# Patient Record
Sex: FEMALE | Race: WHITE | Employment: UNEMPLOYED | URBAN - METROPOLITAN AREA
[De-identification: names, ages, dates, MRNs, and addresses within clinical notes are randomized per-mention and may not be internally consistent; named-entity substitution may affect disease eponyms.]

---

## 2017-04-21 PROBLEM — R55 SYNCOPE AND COLLAPSE: Status: ACTIVE | Noted: 2017-04-21
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
## 2018-06-25 ENCOUNTER — HOSPITAL ENCOUNTER (OUTPATIENT)
Dept: OTHER | Age: 63
Discharge: HOME | End: 2018-06-25
Attending: PSYCHIATRY & NEUROLOGY | Admitting: PSYCHIATRY & NEUROLOGY

## 2018-06-25 LAB
CREAT SERPL-MCNC: 0.6 MG/DL (ref 0.6–1.2)
GFR AFRICAN AMERICAN: >60
GFR NON-AFRICAN AMERICAN: >60

## 2019-10-21 ENCOUNTER — HOSPITAL ENCOUNTER (OUTPATIENT)
Age: 64
Discharge: HOME | End: 2019-10-21
Payer: COMMERCIAL

## 2019-10-21 ENCOUNTER — INITIAL CONSULT (OUTPATIENT)
Dept: NEUROLOGY | Age: 64
End: 2019-10-21
Payer: COMMERCIAL

## 2019-10-21 VITALS
BODY MASS INDEX: 27.46 KG/M2 | HEIGHT: 63 IN | OXYGEN SATURATION: 96 % | HEART RATE: 60 BPM | SYSTOLIC BLOOD PRESSURE: 137 MMHG | WEIGHT: 155 LBS | DIASTOLIC BLOOD PRESSURE: 76 MMHG

## 2019-10-21 DIAGNOSIS — E78.5 DYSLIPIDEMIA: ICD-10-CM

## 2019-10-21 DIAGNOSIS — R41.3 MEMORY LOSS: ICD-10-CM

## 2019-10-21 DIAGNOSIS — G40.909 SEIZURE DISORDER (HCC): ICD-10-CM

## 2019-10-21 DIAGNOSIS — G40.909 SEIZURE DISORDER (HCC): Primary | ICD-10-CM

## 2019-10-21 DIAGNOSIS — G31.89 COGNITIVE AND NEUROBEHAVIORAL DYSFUNCTION FOLLOWING BRAIN INJURY (HCC): ICD-10-CM

## 2019-10-21 DIAGNOSIS — G47.26 SLEEP DISORDER, SHIFT WORK: ICD-10-CM

## 2019-10-21 DIAGNOSIS — F09 COGNITIVE AND NEUROBEHAVIORAL DYSFUNCTION FOLLOWING BRAIN INJURY (HCC): ICD-10-CM

## 2019-10-21 DIAGNOSIS — S06.9XAS COGNITIVE AND NEUROBEHAVIORAL DYSFUNCTION FOLLOWING BRAIN INJURY (HCC): ICD-10-CM

## 2019-10-21 DIAGNOSIS — I10 HTN (HYPERTENSION), BENIGN: ICD-10-CM

## 2019-10-21 LAB
FOLATE: >20 NG/ML (ref 4.78–24.2)
TSH SERPL DL<=0.05 MIU/L-ACNC: 1.86 UIU/ML (ref 0.27–4.2)
VITAMIN B-12: 1260 PG/ML (ref 211–911)

## 2019-10-21 PROCEDURE — 36415 COLL VENOUS BLD VENIPUNCTURE: CPT

## 2019-10-21 PROCEDURE — 84443 ASSAY THYROID STIM HORMONE: CPT

## 2019-10-21 PROCEDURE — G8419 CALC BMI OUT NRM PARAM NOF/U: HCPCS | Performed by: PSYCHIATRY & NEUROLOGY

## 2019-10-21 PROCEDURE — 1036F TOBACCO NON-USER: CPT | Performed by: PSYCHIATRY & NEUROLOGY

## 2019-10-21 PROCEDURE — 99204 OFFICE O/P NEW MOD 45 MIN: CPT | Performed by: PSYCHIATRY & NEUROLOGY

## 2019-10-21 PROCEDURE — 82607 VITAMIN B-12: CPT

## 2019-10-21 PROCEDURE — 3017F COLORECTAL CA SCREEN DOC REV: CPT | Performed by: PSYCHIATRY & NEUROLOGY

## 2019-10-21 PROCEDURE — G8599 NO ASA/ANTIPLAT THER USE RNG: HCPCS | Performed by: PSYCHIATRY & NEUROLOGY

## 2019-10-21 PROCEDURE — G8427 DOCREV CUR MEDS BY ELIG CLIN: HCPCS | Performed by: PSYCHIATRY & NEUROLOGY

## 2019-10-21 PROCEDURE — G8484 FLU IMMUNIZE NO ADMIN: HCPCS | Performed by: PSYCHIATRY & NEUROLOGY

## 2019-10-21 PROCEDURE — 82746 ASSAY OF FOLIC ACID SERUM: CPT

## 2019-10-21 RX ORDER — HYDROCHLOROTHIAZIDE 25 MG/1
TABLET ORAL
COMMUNITY
Start: 2019-10-18

## 2019-10-21 RX ORDER — LEVETIRACETAM 500 MG/1
500 TABLET ORAL 2 TIMES DAILY
Qty: 180 TABLET | Refills: 1 | Status: SHIPPED | OUTPATIENT
Start: 2019-10-21 | End: 2020-04-16

## 2019-10-21 RX ORDER — POTASSIUM CHLORIDE 750 MG/1
TABLET, FILM COATED, EXTENDED RELEASE ORAL
COMMUNITY
Start: 2019-09-18

## 2019-12-04 ENCOUNTER — OFFICE VISIT (OUTPATIENT)
Dept: NEUROLOGY | Age: 64
End: 2019-12-04
Payer: COMMERCIAL

## 2019-12-04 VITALS
SYSTOLIC BLOOD PRESSURE: 110 MMHG | BODY MASS INDEX: 27.46 KG/M2 | HEART RATE: 60 BPM | HEIGHT: 63 IN | OXYGEN SATURATION: 94 % | WEIGHT: 155 LBS | DIASTOLIC BLOOD PRESSURE: 71 MMHG

## 2019-12-04 DIAGNOSIS — G47.26 SLEEP DISORDER, SHIFT WORK: ICD-10-CM

## 2019-12-04 DIAGNOSIS — E78.5 DYSLIPIDEMIA: ICD-10-CM

## 2019-12-04 DIAGNOSIS — G40.909 SEIZURE DISORDER (HCC): ICD-10-CM

## 2019-12-04 DIAGNOSIS — G31.84 MCI (MILD COGNITIVE IMPAIRMENT): ICD-10-CM

## 2019-12-04 DIAGNOSIS — G31.89 COGNITIVE AND NEUROBEHAVIORAL DYSFUNCTION FOLLOWING BRAIN INJURY (HCC): Primary | ICD-10-CM

## 2019-12-04 DIAGNOSIS — S06.9XAS COGNITIVE AND NEUROBEHAVIORAL DYSFUNCTION FOLLOWING BRAIN INJURY (HCC): Primary | ICD-10-CM

## 2019-12-04 DIAGNOSIS — I10 HTN (HYPERTENSION), BENIGN: ICD-10-CM

## 2019-12-04 DIAGNOSIS — F09 COGNITIVE AND NEUROBEHAVIORAL DYSFUNCTION FOLLOWING BRAIN INJURY (HCC): Primary | ICD-10-CM

## 2019-12-04 PROCEDURE — 3017F COLORECTAL CA SCREEN DOC REV: CPT | Performed by: PSYCHIATRY & NEUROLOGY

## 2019-12-04 PROCEDURE — G8427 DOCREV CUR MEDS BY ELIG CLIN: HCPCS | Performed by: PSYCHIATRY & NEUROLOGY

## 2019-12-04 PROCEDURE — 1036F TOBACCO NON-USER: CPT | Performed by: PSYCHIATRY & NEUROLOGY

## 2019-12-04 PROCEDURE — G8484 FLU IMMUNIZE NO ADMIN: HCPCS | Performed by: PSYCHIATRY & NEUROLOGY

## 2019-12-04 PROCEDURE — G8599 NO ASA/ANTIPLAT THER USE RNG: HCPCS | Performed by: PSYCHIATRY & NEUROLOGY

## 2019-12-04 PROCEDURE — 99214 OFFICE O/P EST MOD 30 MIN: CPT | Performed by: PSYCHIATRY & NEUROLOGY

## 2019-12-04 PROCEDURE — G8419 CALC BMI OUT NRM PARAM NOF/U: HCPCS | Performed by: PSYCHIATRY & NEUROLOGY

## 2020-04-13 ENCOUNTER — TELEPHONE (OUTPATIENT)
Dept: NEUROLOGY | Age: 65
End: 2020-04-13

## 2020-04-16 ENCOUNTER — VIRTUAL VISIT (OUTPATIENT)
Dept: NEUROLOGY | Age: 65
End: 2020-04-16
Payer: MEDICARE

## 2020-04-16 PROBLEM — F02.80 DEMENTIA DUE TO ALZHEIMER'S DISEASE (HCC): Status: ACTIVE | Noted: 2020-04-16

## 2020-04-16 PROBLEM — G30.9 DEMENTIA DUE TO ALZHEIMER'S DISEASE (HCC): Status: ACTIVE | Noted: 2020-04-16

## 2020-04-16 PROCEDURE — 99214 OFFICE O/P EST MOD 30 MIN: CPT | Performed by: PSYCHIATRY & NEUROLOGY

## 2020-04-16 RX ORDER — DONEPEZIL HYDROCHLORIDE 5 MG/1
5 TABLET, FILM COATED ORAL DAILY
Qty: 30 TABLET | Refills: 2 | Status: SHIPPED | OUTPATIENT
Start: 2020-04-16 | End: 2020-06-04 | Stop reason: SDUPTHER

## 2020-04-16 RX ORDER — LEVETIRACETAM 500 MG/1
TABLET ORAL
Qty: 180 TABLET | Refills: 1 | Status: SHIPPED | OUTPATIENT
Start: 2020-04-16 | End: 2020-10-19

## 2020-04-16 NOTE — TELEPHONE ENCOUNTER
Last seen 12.04.19     Last office note states to RTO 2-3 months/02-03.2020     Next appointment scheduled for today 04.16.20.
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
## 2020-05-12 ENCOUNTER — HOSPITAL ENCOUNTER (OUTPATIENT)
Dept: MRI IMAGING | Age: 65
Discharge: HOME | End: 2020-05-12
Payer: MEDICARE

## 2020-05-12 PROCEDURE — 70551 MRI BRAIN STEM W/O DYE: CPT

## 2020-06-04 ENCOUNTER — OFFICE VISIT (OUTPATIENT)
Dept: NEUROLOGY | Age: 65
End: 2020-06-04
Payer: MEDICARE

## 2020-06-04 VITALS
HEIGHT: 63 IN | TEMPERATURE: 97.4 F | HEART RATE: 57 BPM | DIASTOLIC BLOOD PRESSURE: 60 MMHG | WEIGHT: 154 LBS | OXYGEN SATURATION: 95 % | BODY MASS INDEX: 27.29 KG/M2 | SYSTOLIC BLOOD PRESSURE: 99 MMHG

## 2020-06-04 PROCEDURE — 99214 OFFICE O/P EST MOD 30 MIN: CPT | Performed by: PSYCHIATRY & NEUROLOGY

## 2020-06-04 PROCEDURE — 1036F TOBACCO NON-USER: CPT | Performed by: PSYCHIATRY & NEUROLOGY

## 2020-06-04 PROCEDURE — G8427 DOCREV CUR MEDS BY ELIG CLIN: HCPCS | Performed by: PSYCHIATRY & NEUROLOGY

## 2020-06-04 PROCEDURE — 3017F COLORECTAL CA SCREEN DOC REV: CPT | Performed by: PSYCHIATRY & NEUROLOGY

## 2020-06-04 PROCEDURE — G8419 CALC BMI OUT NRM PARAM NOF/U: HCPCS | Performed by: PSYCHIATRY & NEUROLOGY

## 2020-06-04 RX ORDER — DONEPEZIL HYDROCHLORIDE 10 MG/1
10 TABLET, FILM COATED ORAL DAILY
Qty: 90 TABLET | Refills: 1 | Status: SHIPPED | OUTPATIENT
Start: 2020-06-04 | End: 2020-11-18

## 2020-06-04 NOTE — PROGRESS NOTES
addressed in details  Continue Keppra 500 mg twice daily  No driving  Seizure precaution, side effect from Keppra and risk of falling were addressed    Discussed behavioral cognitive therapy to improve memory  Discussed MRI results with the patient and family    Addressed possibility of insomnia and shiftwork disorder. Long and detailed discussion today regarding improving sleep hygiene and treatment of shiftwork disorder. She was advised take melatonin at that time and to avoid daytime naps. She was also advised to get good exposure to sunlight during daytime and avoid exposure in the afternoon.     Continue current blood pressure medication  Aspirin  Continue monitor blood sugar and follow A1c  Continue SSRI    Follow-up 6-month

## 2020-10-19 RX ORDER — LEVETIRACETAM 500 MG/1
TABLET ORAL
Qty: 180 TABLET | Refills: 0 | Status: SHIPPED | OUTPATIENT
Start: 2020-10-19 | End: 2020-12-08 | Stop reason: SDUPTHER

## 2020-10-20 RX ORDER — LEVETIRACETAM 500 MG/1
TABLET ORAL
Qty: 180 TABLET | Refills: 0 | OUTPATIENT
Start: 2020-10-20

## 2020-10-21 RX ORDER — LEVETIRACETAM 500 MG/1
TABLET ORAL
Qty: 180 TABLET | Refills: 0 | OUTPATIENT
Start: 2020-10-21

## 2020-11-18 RX ORDER — DONEPEZIL HYDROCHLORIDE 10 MG/1
TABLET, FILM COATED ORAL
Qty: 90 TABLET | Refills: 0 | Status: SHIPPED | OUTPATIENT
Start: 2020-11-18 | End: 2020-12-08 | Stop reason: SDUPTHER

## 2020-11-18 NOTE — TELEPHONE ENCOUNTER
Last seen 06.04.20    Last office note states to RTO 6 months/12.2020    Next appointment scheduled for 12.08.20

## 2020-12-08 ENCOUNTER — OFFICE VISIT (OUTPATIENT)
Dept: NEUROLOGY | Age: 65
End: 2020-12-08
Payer: MEDICARE

## 2020-12-08 VITALS
WEIGHT: 154 LBS | OXYGEN SATURATION: 99 % | SYSTOLIC BLOOD PRESSURE: 123 MMHG | HEART RATE: 65 BPM | BODY MASS INDEX: 27.29 KG/M2 | TEMPERATURE: 95.9 F | DIASTOLIC BLOOD PRESSURE: 73 MMHG | HEIGHT: 63 IN

## 2020-12-08 PROBLEM — G25.0 ESSENTIAL TREMOR: Status: ACTIVE | Noted: 2020-12-08

## 2020-12-08 PROCEDURE — G8484 FLU IMMUNIZE NO ADMIN: HCPCS | Performed by: PSYCHIATRY & NEUROLOGY

## 2020-12-08 PROCEDURE — G8427 DOCREV CUR MEDS BY ELIG CLIN: HCPCS | Performed by: PSYCHIATRY & NEUROLOGY

## 2020-12-08 PROCEDURE — 3017F COLORECTAL CA SCREEN DOC REV: CPT | Performed by: PSYCHIATRY & NEUROLOGY

## 2020-12-08 PROCEDURE — G8419 CALC BMI OUT NRM PARAM NOF/U: HCPCS | Performed by: PSYCHIATRY & NEUROLOGY

## 2020-12-08 PROCEDURE — 3046F HEMOGLOBIN A1C LEVEL >9.0%: CPT | Performed by: PSYCHIATRY & NEUROLOGY

## 2020-12-08 PROCEDURE — 2022F DILAT RTA XM EVC RTNOPTHY: CPT | Performed by: PSYCHIATRY & NEUROLOGY

## 2020-12-08 PROCEDURE — 99214 OFFICE O/P EST MOD 30 MIN: CPT | Performed by: PSYCHIATRY & NEUROLOGY

## 2020-12-08 PROCEDURE — 1036F TOBACCO NON-USER: CPT | Performed by: PSYCHIATRY & NEUROLOGY

## 2020-12-08 RX ORDER — LEVETIRACETAM 500 MG/1
TABLET ORAL
Qty: 180 TABLET | Refills: 1 | Status: SHIPPED | OUTPATIENT
Start: 2020-12-08 | End: 2021-08-11 | Stop reason: SDUPTHER

## 2020-12-08 RX ORDER — DONEPEZIL HYDROCHLORIDE 10 MG/1
TABLET, FILM COATED ORAL
Qty: 90 TABLET | Refills: 1 | Status: SHIPPED | OUTPATIENT
Start: 2020-12-08 | End: 2021-08-11 | Stop reason: SDUPTHER

## 2020-12-08 NOTE — PROGRESS NOTES
The patient came today for follow up regarding: Memory loss and history of seizures. Since her last visit, she continues to have daily episode of short-term memory loss. Degree is moderate. She can be forgetful to daily events no recent memory. So far she is able to function by herself. No recent worsening of her memory. No falling or injury. No psychosis, hallucination or suicidal ideation or thoughts. No other triggers or other associated symptoms. She is on Aricept 10 mg daily. No side effect of Aricept. Reported mild improvement on such medicine. She does have history of seizure disorder. Last seizure was in 2018. She is on Keppra 500 mg twice daily. She denies any side effect from [address]. The same chronic insomnia. No severe EDS. No other parasomnia or sleep disturbance. She takes melatonin as needed. She does have history of diabetes and hypertension. No recent A1c. She does not measure her monitor blood pressure blood sugar daily. She takes aspirin and statin. No recent change with her medications. She is concerned about chronic tremors in both hands and shivering of her lips. Degree is mild and frequency is daily and persistent. Triggered by stress or action tremors. No resting tremors. So for these tremors are not interfering with ADL. She denies any rigidity or stiffness. No other new symptoms today. Other review of system was unremarkable. Family History   Problem Relation Age of Onset   [place] Alzheimer's Disease Mother     Diabetes Father     Hypertension Father        Past Medical History:   Diagnosis Date    Anxiety     CAD (coronary artery disease)     [facility]     Diabetes mellitus [facility]     Hyperlipidemia     Hypertension      Prior to Visit Medications    Medication Sig Taking?  Authorizing Provider   levETIRAcetam (KEPPRA) 500 MG tablet TAKE ONE TABLET BY MOUTH TWICE Kandice Matt MD   donepezil (ARICEPT) 10 MG tablet TAKE ONE TABLET BY MOUTH DAILY Yes Shay Astudillo MD   potassium chloride (KLOR-CON) 10 MEQ extended release tablet TAKE TWO TABLETS BY MOUTH TWICE A DAY Yes Historical Provider, MD   hydrochlorothiazide (HYDRODIURIL) 25 MG tablet  Yes Historical Provider, MD   clonazePAM (KLONOPIN) 0.5 MG tablet Take 0.5 mg by mouth nightly as needed for Anxiety. Yes Historical Provider, MD   aspirin 81 MG EC tablet Take 1 tablet by mouth daily Yes Jordin Fischer MD   carvedilol (COREG) 12.5 MG tablet Take 12.5 mg by mouth 2 times daily (with meals) Yes Historical Provider, MD   PARoxetine (PAXIL) 40 MG tablet Take 60 mg by mouth every morning Yes Historical Provider, MD   simvastatin (ZOCOR) 40 MG tablet Take 1 tablet by mouth nightly. Yes Devonte Ordonez MD     No Known Allergies  Social History     Tobacco Use    Smoking status: Never Smoker    Smokeless tobacco: Never Used   Substance Use Topics    Alcohol use: No     Past Surgical History:   Procedure Laterality Date    CORONARY ANGIOPLASTY WITH STENT PLACEMENT      7 times    CORONARY ARTERY BYPASS GRAFT      Triple bypass    HYSTERECTOMY             Exam:   Constitutional:   Vitals:    12/08/20 0935   BP: 123/73   Pulse: 65   Temp: 95.9 °F (35.5 °C)   TempSrc: Infrared   SpO2: 99%   Weight: 154 lb (69.9 kg)   Height: 5' 3\" (1.6 m)       General appearance:  Normal development and appear in no acute distress. Eye: No icterus. Neck: supple  Cardiovascular:   No lower leg edema with good pulsation. Mental Status: no change  Oriented to person, place, problem, and time. Memory: Good immediate recall. Intact remote memory  Normal attention span and concentration. Language: intact naming, repeating and fluency   Poor fund of Knowledge. Aware of current events and vocabulary   Cranial Nerves:   II:   Pupils: equal, round, reactive to light  III,IV,VI: Extra Ocular Movements are intact.  No nystagmus  V: Facial sensation is intact   VII: Facial strength and movements: intact and symmetric  XII: Tongue movements are normal  Musculoskeletal: 5/5 in all 4 extremities. Tone: Normal tone. Reflexes: Bilateral biceps 2/4, triceps 2/4, brachial radialis 2/4, knee 2/4 and ankle 2/4. Coordination: no pronator drift, no dysmetria with FNF. Normal REM. Mild postural hand tremors. No resting tremors or cogwheeling. Sensation: normal to all modalities in both arms and legs. Gait/Posture: steady gait   unchanged      ROS : A 10-14 system review of constitutional, cardiovascular, respiratory, GI, eyes, , ENT, musculoskeletal, endocrine, hematological, skin, SHEENT, genitourinary, psychiatric and neurologic systems was obtained and updated today which is unremarkable except as mentioned in my HPI  The same    Medical decision making: I personally reviewed and updated social history, past medical history, medications, allergy, surgical history, and family history as documented in the patient's electronic health records. Labs and/or neuroimaging and other test results reviewed and discussed with the patient. Reviewed notes from other physicians. Provided patient education regarding risk, benefits and treatment options as well as adherence to medication regimen and side effect from these medications. Assessment:      1. Cognitive and neurobehavioral dysfunction [facility] ([facility]  Likely combination of chronic cognitive impairment secondary to history of brain injury and underlying neurodegenerative disorder    Continue Aricept 10 mg daily  6-month refill  Side effect was discussed  Discussed cognitive and behavior therapy with improve memory at home  Driving precautions. - donepezil (ARICEPT) 10 MG tablet; TAKE ONE TABLET BY MOUTH DAILY  Dispense: 90 tablet; Refill: 1    2.  Seizure disorder ([facility] [address] controlled  Continue Keppra 500 mg twice daily  6-month refill  Discussed side effect of Keppra, seizure precaution, risk of falling and injury and driving restrictions. - levETIRAcetam (KEPPRA) 500 MG tablet; TAKE ONE TABLET BY MOUTH TWICE A DAY  Dispense: 180 tablet; Refill: 1    3. HTN (hypertension), benign  Advised the patient to monitor blood pressure weekly  Continue current medications    4. DM (diabetes mellitus), type 2, controlled with complications ([facility]  Follow A1c with PCP  Blood sugar monitor at home  Aspirin  Statin for stroke prevention    5. Sleep disorder, shift work that resulted in insomnia  Discussed improving sleep hygiene avoid daytime naps  She should be strict with nighttime schedule    6. Dyslipidemia  Statin and follow LDL    7. Essential tremor, benign. No signs of Parkinson disease. No need for further intervention at this time as it is not interfering with ADL.   I assured the patient regarding benign nature of these tremors    Follow-up 6-month

## 2021-06-30 DIAGNOSIS — F09 COGNITIVE AND NEUROBEHAVIORAL DYSFUNCTION FOLLOWING BRAIN INJURY (HCC): ICD-10-CM

## 2021-06-30 DIAGNOSIS — G31.89 COGNITIVE AND NEUROBEHAVIORAL DYSFUNCTION FOLLOWING BRAIN INJURY (HCC): ICD-10-CM

## 2021-06-30 DIAGNOSIS — G40.909 SEIZURE DISORDER (HCC): ICD-10-CM

## 2021-06-30 DIAGNOSIS — S06.9XAS COGNITIVE AND NEUROBEHAVIORAL DYSFUNCTION FOLLOWING BRAIN INJURY (HCC): ICD-10-CM

## 2021-06-30 RX ORDER — LEVETIRACETAM 500 MG/1
TABLET ORAL
Qty: 180 TABLET | Refills: 0 | OUTPATIENT
Start: 2021-06-30

## 2021-06-30 RX ORDER — DONEPEZIL HYDROCHLORIDE 10 MG/1
TABLET, FILM COATED ORAL
Qty: 90 TABLET | Refills: 0 | OUTPATIENT
Start: 2021-06-30

## 2021-08-09 DIAGNOSIS — F09 COGNITIVE AND NEUROBEHAVIORAL DYSFUNCTION FOLLOWING BRAIN INJURY (HCC): ICD-10-CM

## 2021-08-09 DIAGNOSIS — S06.9XAS COGNITIVE AND NEUROBEHAVIORAL DYSFUNCTION FOLLOWING BRAIN INJURY (HCC): ICD-10-CM

## 2021-08-09 DIAGNOSIS — G40.909 SEIZURE DISORDER (HCC): ICD-10-CM

## 2021-08-09 DIAGNOSIS — G31.89 COGNITIVE AND NEUROBEHAVIORAL DYSFUNCTION FOLLOWING BRAIN INJURY (HCC): ICD-10-CM

## 2021-08-10 RX ORDER — DONEPEZIL HYDROCHLORIDE 10 MG/1
TABLET, FILM COATED ORAL
Qty: 90 TABLET | Refills: 0 | OUTPATIENT
Start: 2021-08-10

## 2021-08-10 RX ORDER — LEVETIRACETAM 500 MG/1
TABLET ORAL
Qty: 180 TABLET | Refills: 0 | OUTPATIENT
Start: 2021-08-10

## 2021-08-10 NOTE — TELEPHONE ENCOUNTER
Patient needs to schedule a follow up appointment.     Last seen 12.08.20     Last office note states to RTO 6 months/06.2021     Next appointment scheduled for none.  (pt noshowed fu appt on 06.01.21)

## 2021-08-11 DIAGNOSIS — G40.909 SEIZURE DISORDER (HCC): ICD-10-CM

## 2021-08-11 DIAGNOSIS — S06.9XAS COGNITIVE AND NEUROBEHAVIORAL DYSFUNCTION FOLLOWING BRAIN INJURY (HCC): ICD-10-CM

## 2021-08-11 DIAGNOSIS — G31.89 COGNITIVE AND NEUROBEHAVIORAL DYSFUNCTION FOLLOWING BRAIN INJURY (HCC): ICD-10-CM

## 2021-08-11 DIAGNOSIS — F09 COGNITIVE AND NEUROBEHAVIORAL DYSFUNCTION FOLLOWING BRAIN INJURY (HCC): ICD-10-CM

## 2021-08-11 RX ORDER — LEVETIRACETAM 500 MG/1
TABLET ORAL
Qty: 180 TABLET | Refills: 1 | OUTPATIENT
Start: 2021-08-11

## 2021-08-11 NOTE — TELEPHONE ENCOUNTER
Pt's niece Salena Chen called to schedule appt that pt missed in June & to see if she could get refills on her meds. Scheduled her an appt & she is requesting refills on pt's Aricept & Keppra (Use [facility] pharmacy on file). Last appt was 12/8/20. Next appt is 11/9/21. Please advise. Thank you.

## 2021-08-11 NOTE — TELEPHONE ENCOUNTER
Last seen 12.08.20    Last office note states to RTO 6 months/06.2021    Next appointment scheduled for 11.09.21

## 2021-08-12 RX ORDER — LEVETIRACETAM 500 MG/1
TABLET ORAL
Qty: 180 TABLET | Refills: 0 | Status: SHIPPED | OUTPATIENT
Start: 2021-08-12 | End: 2021-11-10

## 2021-08-12 RX ORDER — DONEPEZIL HYDROCHLORIDE 10 MG/1
TABLET, FILM COATED ORAL
Qty: 90 TABLET | Refills: 0 | Status: SHIPPED | OUTPATIENT
Start: 2021-08-12 | End: 2021-11-10

## 2021-11-10 DIAGNOSIS — S06.9XAS COGNITIVE AND NEUROBEHAVIORAL DYSFUNCTION FOLLOWING BRAIN INJURY (HCC): ICD-10-CM

## 2021-11-10 DIAGNOSIS — G40.909 SEIZURE DISORDER (HCC): ICD-10-CM

## 2021-11-10 DIAGNOSIS — F09 COGNITIVE AND NEUROBEHAVIORAL DYSFUNCTION FOLLOWING BRAIN INJURY (HCC): ICD-10-CM

## 2021-11-10 DIAGNOSIS — G31.89 COGNITIVE AND NEUROBEHAVIORAL DYSFUNCTION FOLLOWING BRAIN INJURY (HCC): ICD-10-CM

## 2021-11-10 RX ORDER — LEVETIRACETAM 500 MG/1
TABLET ORAL
Qty: 180 TABLET | Refills: 0 | Status: SHIPPED | OUTPATIENT
Start: 2021-11-10 | End: 2022-02-02

## 2021-11-10 RX ORDER — DONEPEZIL HYDROCHLORIDE 10 MG/1
TABLET, FILM COATED ORAL
Qty: 90 TABLET | Refills: 0 | Status: SHIPPED | OUTPATIENT
Start: 2021-11-10 | End: 2022-02-09

## 2021-11-10 NOTE — TELEPHONE ENCOUNTER
Last seen: 12/8/20    Assessment:        1. Cognitive and neurobehavioral dysfunction following brain injury ([facility]  Likely combination of chronic cognitive impairment secondary to history of brain injury and underlying neurodegenerative disorder     Continue Aricept 10 mg daily  6-month refill  Side effect was discussed  Discussed cognitive and behavior therapy with improve memory at home  Driving precautions. - donepezil (ARICEPT) 10 MG tablet; TAKE ONE TABLET BY MOUTH DAILY  Dispense: 90 tablet; Refill: 1     2. Seizure disorder ([facility] [address] [facility] controlled  Continue Keppra 500 mg twice daily  6-month refill  Discussed side effect of Keppra, seizure precaution, risk of falling and injury and driving restrictions.           - levETIRAcetam (KEPPRA) 500 MG tablet; TAKE ONE TABLET BY MOUTH TWICE A DAY  Dispense: 180 tablet; Refill: 1     3. HTN (hypertension), benign  Advised the patient to monitor blood pressure weekly  Continue current medications     4. DM (diabetes mellitus), type 2, controlled with complications (HCC)  Follow A1c with PCP  Blood sugar monitor at home  Aspirin  Statin for stroke prevention     5. Sleep disorder, shift work that resulted in insomnia  Discussed improving sleep hygiene avoid daytime naps  She should be strict with nighttime schedule     6. Dyslipidemia  Statin and follow LDL     7. Essential tremor, benign. No signs of Parkinson disease. No need for further intervention at this time as it is not interfering with ADL. I assured the patient regarding benign nature of these tremors     Follow-up 6-month    Next apt: 12/2/21    Last filled:  Both meds last filled 8/12/21 for 90 day supply with zero refills)

## 2021-11-16 DIAGNOSIS — F09 COGNITIVE AND NEUROBEHAVIORAL DYSFUNCTION FOLLOWING BRAIN INJURY (HCC): ICD-10-CM

## 2021-11-16 DIAGNOSIS — G31.89 COGNITIVE AND NEUROBEHAVIORAL DYSFUNCTION FOLLOWING BRAIN INJURY (HCC): ICD-10-CM

## 2021-11-16 DIAGNOSIS — S06.9XAS COGNITIVE AND NEUROBEHAVIORAL DYSFUNCTION FOLLOWING BRAIN INJURY (HCC): ICD-10-CM

## 2021-11-17 RX ORDER — DONEPEZIL HYDROCHLORIDE 10 MG/1
TABLET, FILM COATED ORAL
Qty: 90 TABLET | Refills: 0 | OUTPATIENT
Start: 2021-11-17

## 2021-11-17 NOTE — TELEPHONE ENCOUNTER
Donepezil 10 mg tabs were last filled on 11/10/21 (#90 w/ zero refills). Rx denied because this request came through too soon.

## 2022-01-31 DIAGNOSIS — G40.909 SEIZURE DISORDER (HCC): ICD-10-CM

## 2022-02-01 NOTE — TELEPHONE ENCOUNTER
LOV 12-8-2020  NOV 6-1-2021 No show, 11-9-2021 Cancelled, 12-2-2021 No show    Called and spoke to caretaker scheduled appt for 2-

## 2022-02-02 RX ORDER — LEVETIRACETAM 500 MG/1
TABLET ORAL
Qty: 180 TABLET | Refills: 0 | Status: SHIPPED | OUTPATIENT
Start: 2022-02-02 | End: 2022-02-09

## 2022-02-09 DIAGNOSIS — F09 COGNITIVE AND NEUROBEHAVIORAL DYSFUNCTION FOLLOWING BRAIN INJURY (HCC): ICD-10-CM

## 2022-02-09 DIAGNOSIS — S06.9XAS COGNITIVE AND NEUROBEHAVIORAL DYSFUNCTION FOLLOWING BRAIN INJURY (HCC): ICD-10-CM

## 2022-02-09 DIAGNOSIS — G31.89 COGNITIVE AND NEUROBEHAVIORAL DYSFUNCTION FOLLOWING BRAIN INJURY (HCC): ICD-10-CM

## 2022-02-09 DIAGNOSIS — G40.909 SEIZURE DISORDER (HCC): ICD-10-CM

## 2022-02-09 RX ORDER — LEVETIRACETAM 500 MG/1
TABLET ORAL
Qty: 180 TABLET | Refills: 0 | Status: SHIPPED | OUTPATIENT
Start: 2022-02-09 | End: 2022-08-15 | Stop reason: SDUPTHER

## 2022-02-09 RX ORDER — DONEPEZIL HYDROCHLORIDE 10 MG/1
TABLET, FILM COATED ORAL
Qty: 90 TABLET | Refills: 0 | Status: SHIPPED | OUTPATIENT
Start: 2022-02-09 | End: 2022-05-11 | Stop reason: SDUPTHER

## 2022-02-15 ENCOUNTER — TELEPHONE (OUTPATIENT)
Dept: NEUROLOGY | Age: 67
End: 2022-02-15

## 2022-05-10 DIAGNOSIS — S06.9XAS COGNITIVE AND NEUROBEHAVIORAL DYSFUNCTION FOLLOWING BRAIN INJURY (HCC): ICD-10-CM

## 2022-05-10 DIAGNOSIS — G31.89 COGNITIVE AND NEUROBEHAVIORAL DYSFUNCTION FOLLOWING BRAIN INJURY (HCC): ICD-10-CM

## 2022-05-10 DIAGNOSIS — F09 COGNITIVE AND NEUROBEHAVIORAL DYSFUNCTION FOLLOWING BRAIN INJURY (HCC): ICD-10-CM

## 2022-05-11 RX ORDER — DONEPEZIL HYDROCHLORIDE 10 MG/1
TABLET, FILM COATED ORAL
Qty: 90 TABLET | Refills: 0 | Status: SHIPPED | OUTPATIENT
Start: 2022-05-11 | End: 2022-08-15 | Stop reason: SDUPTHER

## 2022-05-11 NOTE — TELEPHONE ENCOUNTER
LOV 12-  NOV scheduled    Assessment:        1. Cognitive and neurobehavioral dysfunction [facility]  Likely combination of chronic cognitive impairment secondary to history of brain injury and underlying neurodegenerative disorder     Continue Aricept 10 mg daily  6-month refill  Side effect was discussed  Discussed cognitive and behavior therapy with improve memory at home  Driving precautions. - donepezil (ARICEPT) 10 MG tablet; TAKE ONE TABLET BY MOUTH DAILY  Dispense: 90 tablet; Refill: 1     2. Seizure disorder ([facility] controlled  Continue Keppra 500 mg twice daily  6-month refill  Discussed side effect of Keppra, seizure precaution, risk of falling and injury and driving restrictions.           - levETIRAcetam (KEPPRA) 500 MG tablet; TAKE ONE TABLET BY MOUTH TWICE A DAY  Dispense: 180 tablet; Refill: 1     3. HTN (hypertension), benign  Advised the patient to monitor blood pressure weekly  Continue current medications     4. DM (diabetes mellitus), type 2, controlled with complications (HCC)  Follow A1c with PCP  Blood sugar monitor at home  Aspirin  Statin for stroke prevention     5. Sleep disorder, shift work that resulted in insomnia  Discussed improving sleep hygiene avoid daytime naps  She should be strict with nighttime schedule     6. Dyslipidemia  Statin and follow LDL     7. Essential tremor, benign. No signs of Parkinson disease. No need for further intervention at this time as it is not interfering with ADL.   I assured the patient regarding benign nature of these tremors     Follow-up 6-month

## 2022-08-15 DIAGNOSIS — G40.909 SEIZURE DISORDER (HCC): ICD-10-CM

## 2022-08-15 DIAGNOSIS — F09 COGNITIVE AND NEUROBEHAVIORAL DYSFUNCTION FOLLOWING BRAIN INJURY (HCC): ICD-10-CM

## 2022-08-15 DIAGNOSIS — G31.89 COGNITIVE AND NEUROBEHAVIORAL DYSFUNCTION FOLLOWING BRAIN INJURY (HCC): ICD-10-CM

## 2022-08-15 DIAGNOSIS — S06.9XAS COGNITIVE AND NEUROBEHAVIORAL DYSFUNCTION FOLLOWING BRAIN INJURY (HCC): ICD-10-CM

## 2022-08-15 RX ORDER — LEVETIRACETAM 500 MG/1
TABLET ORAL
Qty: 180 TABLET | Refills: 0 | Status: SHIPPED | OUTPATIENT
Start: 2022-08-15

## 2022-08-15 RX ORDER — DONEPEZIL HYDROCHLORIDE 10 MG/1
TABLET, FILM COATED ORAL
Qty: 90 TABLET | Refills: 0 | Status: SHIPPED | OUTPATIENT
Start: 2022-08-15 | End: 2022-11-13

## 2022-11-21 DIAGNOSIS — G40.909 SEIZURE DISORDER (HCC): ICD-10-CM

## 2022-11-21 RX ORDER — LEVETIRACETAM 500 MG/1
TABLET ORAL
Qty: 56 TABLET | Refills: 0 | OUTPATIENT
Start: 2022-11-21